# Patient Record
(demographics unavailable — no encounter records)

---

## 2025-05-28 NOTE — HISTORY OF PRESENT ILLNESS
[FreeTextEntry1] : 55 year old man with a history of depression. hypotestosteronemia, HTN, early CAD with DAD presents for an initial evaluation.   He has new RBBB. He is relatively active with weightlifting weights four days a week. He   denies any chest pain, PND, orthopnea, lower extremity edema, near syncope, syncope, strokelike symptoms. Medication reconciliation performed. He is compliant with his medications.

## 2025-05-28 NOTE — DISCUSSION/SUMMARY
[FreeTextEntry1] : 55 year man with a history as listed presents for an initial cardiac evaluation.  En has a new RBBB which is seen on EKG. He will undergo a treadmill exercise stress test to define exercise tolerance, rule out exertional hypertensive responses, assess for exercise induced arrhythmias and rule out ischemia from obstructive CAD. He will get a 2d echo to assess for any  new structural heart disease, changes in valvular and ventricular function.  Given his family hisotry  he will get a ca score.  His blood pressure and heart rate are controlled. He will continue Benazapril Amlodipine. He will try to maintain a BP log at home. Reducing dietary salt intake advised. He will try to maintain a BP log at home. Reducing dietary salt intake advised. Exercise and diet counseling was performed in order to reduce her future cardiovascular risk.  He will followup with me in 6 months  or sooner if necessary.  [EKG obtained to assist in diagnosis and management of assessed problem(s)] : EKG obtained to assist in diagnosis and management of assessed problem(s)

## 2025-06-10 NOTE — PLAN
[FreeTextEntry1] : Pt will continue with current medications Continue with current exercise Recommend trying to further improve diet for 10lb weight loss.

## 2025-06-10 NOTE — HISTORY OF PRESENT ILLNESS
[de-identified] : Pt presents to the office today for follow up medication management.  Pt has been feeling well with no complaints Complaint with medications and is feeling well with current doses Exercise is still doing well